# Patient Record
Sex: FEMALE | Race: WHITE
[De-identification: names, ages, dates, MRNs, and addresses within clinical notes are randomized per-mention and may not be internally consistent; named-entity substitution may affect disease eponyms.]

---

## 2019-12-21 ENCOUNTER — HOSPITAL ENCOUNTER (EMERGENCY)
Dept: HOSPITAL 25 - UCCORT | Age: 24
Discharge: HOME | End: 2019-12-21
Payer: COMMERCIAL

## 2019-12-21 VITALS — SYSTOLIC BLOOD PRESSURE: 111 MMHG | DIASTOLIC BLOOD PRESSURE: 68 MMHG

## 2019-12-21 DIAGNOSIS — J32.9: Primary | ICD-10-CM

## 2019-12-21 DIAGNOSIS — Z88.8: ICD-10-CM

## 2019-12-21 DIAGNOSIS — R05: ICD-10-CM

## 2019-12-21 DIAGNOSIS — F17.290: ICD-10-CM

## 2019-12-21 PROCEDURE — 99212 OFFICE O/P EST SF 10 MIN: CPT

## 2019-12-21 PROCEDURE — G0463 HOSPITAL OUTPT CLINIC VISIT: HCPCS

## 2019-12-21 NOTE — UC
Throat Pain/Nasal Robbi HPI





- HPI Summary


HPI Summary: 





Pt had a URI over one week ago which she thinks got better but now has sinus 

pressure, greenish nasal coryza and productive cough. Fever yesterday but not 

today.





- History of Current Complaint


Chief Complaint: UCRespiratory


Stated Complaint: COUGH/FEVER


Time Seen by Provider: 12/21/19 13:06


Hx Obtained From: Patient


Hx Last Menstrual Period: 10/30/19


Pregnant?: No


Onset/Duration: Gradual Onset


Severity: Mild


Pain Intensity: 5


Cough: Productive


Associated Signs & Symptoms: Positive: Wheezing, Sinus Discomfort, Nasal 

Discharge





- Allergies/Home Medications


Allergies/Adverse Reactions: 


 Allergies











Allergy/AdvReac Type Severity Reaction Status Date / Time


 


prednisone Allergy  Headache Verified 12/21/19 12:41











Home Medications: 


 Home Medications





Levothyroxine TAB* [Synthroid TAB*] 75 mcg PO DAILY 12/21/19 [History Confirmed 

12/21/19]


metFORMIN* [Glucophage 500 MG TAB *] 500 mg PO BID 12/21/19 [History Confirmed 

12/21/19]











PMH/Surg Hx/FS Hx/Imm Hx


Previously Healthy: Yes





- Surgical History


Surgical History: Yes


Surgery Procedure, Year, and Place: T&A, lipoma removed 6/2019





- Family History


Known Family History: Positive: Hypertension, Diabetes





- Social History


Alcohol Use: Occasionally


Substance Use Type: None


Substance Use Comment - Amount & Last Used: rare


Smoking Status (MU): Light Every Day Tobacco Smoker


Type: eCigarettes


Amount Used/How Often: casual smoker


Have You Smoked in the Last Year: Yes





- Immunization History


Most Recent Influenza Vaccination: no


Vaccination Up to Date: Yes





Review of Systems


All Other Systems Reviewed And Are Negative: Yes


Constitutional: Positive: Fever - Fever yesterday


ENT: Positive: Nasal Discharge, Sinus Congestion, Sinus Pain/Tenderness - Yellow

/green nasal coryza and post nasal drainage


Respiratory: Positive: Cough - Occasionally coughs up greenish phlegm


Is Patient Immunocompromised?: No





Physical Exam


Triage Information Reviewed: Yes


Appearance: Well-Appearing, No Pain Distress, Well-Nourished


Vital Signs: 


 Initial Vital Signs











Temp  98.5 F   12/21/19 12:45


 


Pulse  77   12/21/19 12:45


 


Resp  18   12/21/19 12:45


 


BP  111/68   12/21/19 12:45


 


Pulse Ox  99   12/21/19 12:45











Vital Signs Reviewed: Yes


Eyes: Positive: Conjunctiva Clear


ENT: Positive: Pharynx normal - Yellow post-nasal drainage, Nasal congestion, 

Nasal drainage, TMs normal, Sinus tenderness, Uvula midline


Neck: Positive: Supple, Nontender, No Lymphadenopathy


Respiratory: Positive: Lungs clear, Normal breath sounds, No respiratory 

distress, No accessory muscle use


Cardiovascular: Positive: RRR, No Murmur, Pulses Normal, Brisk Capillary Refill


Musculoskeletal Exam: Normal


Neurological Exam: Normal


Psychological Exam: Normal


Skin Exam: Normal





Throat Pain/Nasal Course/Dx





- Course


Course Of Treatment: 





Comfortable here. Pt taught use of inhaler





- Differential Dx/Diagnosis


Provider Diagnosis: 


 Sinusitis








Discharge ED





- Sign-Out/Discharge


Documenting (check all that apply): Patient Departure


All imaging exams completed and their final reports reviewed: No Studies





- Discharge Plan


Condition: Good


Disposition: HOME


Prescriptions: 


Albuterol HFA INHALER* [Ventolin HFA Inhaler*] 2 puff INH Q4H PRN 5 Days #1 mdi


 PRN Reason: Wheezing


Amoxicillin/Clavulanate TAB* [Augmentin *] 875 mg PO BID 10 Days #20 tab


Patient Education Materials:  Sinusitis (ED)


Referrals: 


Katarina Lazaro PA [Primary Care Provider] - 


Additional Instructions: 


Increase fluids, take the Augmentin with food. Use the inhaler every 4 hours as 

needed for wheezing. See your doctor in 5-6 days if no improvement. Go to the 

ER if worsening symptoms.





- Billing Disposition and Condition


Condition: GOOD


Disposition: Home

## 2020-03-20 ENCOUNTER — HOSPITAL ENCOUNTER (EMERGENCY)
Dept: HOSPITAL 25 - UCCORT | Age: 25
Discharge: HOME | End: 2020-03-20
Payer: SELF-PAY

## 2020-03-20 VITALS — SYSTOLIC BLOOD PRESSURE: 116 MMHG | DIASTOLIC BLOOD PRESSURE: 82 MMHG

## 2020-03-20 DIAGNOSIS — F17.290: ICD-10-CM

## 2020-03-20 DIAGNOSIS — Z91.018: ICD-10-CM

## 2020-03-20 DIAGNOSIS — Z88.8: ICD-10-CM

## 2020-03-20 DIAGNOSIS — M54.5: Primary | ICD-10-CM

## 2020-03-20 DIAGNOSIS — Z91.030: ICD-10-CM

## 2020-03-20 PROCEDURE — G0463 HOSPITAL OUTPT CLINIC VISIT: HCPCS

## 2020-03-20 PROCEDURE — 99212 OFFICE O/P EST SF 10 MIN: CPT

## 2020-03-20 NOTE — UC
Back Pain HPI





- HPI Summary


HPI Summary: 





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  Pt states pain began gradually and worsened 

through the night





- History of Current Complaint


Chief Complaint: UCBackPain


Stated Complaint: BACK PAIN


Time Seen by Provider: 03/20/20 10:29


Hx Obtained From: Patient


Hx Last Menstrual Period: "[place] ... I'm not ovulating"


Pregnant?: No


Onset/Duration: Gradual Onset, Lasting Hours


Timing: Constant


Severity Initially: Mild


Severity Currently: Moderate


Pain Intensity: 5


Character: Dull, Aching, Stiffness


Aggravating Factor(s): Movement, Bending


Alleviating Factor(s): Rest, Position


Associated Signs And Symptoms: Positive: Negative





- Risk Factors


AAA Risk Factors: Negative


TAD Risk Factors: Negative


Cauda Equina Risk Factors: Negative


Epidural Abscess Risk Factors: Negative





- Allergies/Home Medications


Allergies/Adverse Reactions: 


 Allergies











Allergy/AdvReac Type Severity Reaction Status Date / Time


 


pedro Allergy  Anaphylatic Verified 03/20/20 09:55





   Shock  


 


prednisone AdvReac  Headache Verified 03/20/20 09:55


 


wasps Allergy  Swelling Uncoded 03/20/20 09:55











Home Medications: 


 Home Medications





Levothyroxine TAB* [Synthroid 75 MCG TAB*] 75 mcg PO DAILY 12/21/19 [History 

Confirmed 03/20/20]


metFORMIN* [Glucophage 500 MG TAB *] 500 mg PO BID 12/21/19 [History Confirmed 

03/20/20]


Acetaminophen [Acetaminophen Extra Strength] 2,000 mg PO ONCE 03/20/20 [History 

Confirmed 03/20/20]


Cyclobenzaprine TAB* [Flexeril 10 MG TAB*] 10 mg PO Q8H PRN #15 tab 03/20/20 [Rx

]


Ibuprofen TAB* [Motrin TAB* 800 MG] 800 mg PO Q8H PRN #15 tab 03/20/20 [Rx]


Prenatal Vitamin TAB* 1 tab PO DAILY 03/20/20 [History Confirmed 03/20/20]


Prenatal Vitamin TAB* 2 tab PO DAILY 03/20/20 [History Confirmed 03/20/20]











PMH/Surg Hx/FS Hx/Imm Hx


Previously Healthy: Yes





- Surgical History


Surgical History: Yes


Surgery Procedure, Year, and Place: Posterior Skull Lipoma, 2019, [facility]; 

T&A, ~2008, [facility]





- Family History


Known Family History: Positive: Hypertension, Diabetes





- Social History


Lives: With Family


Alcohol Use: Occasionally


Substance Use Type: Marijuana


Substance Use Comment - Amount & Last Used: Occasionally


Smoking Status (MU): Light Every Day Tobacco Smoker


Type: eCigarettes


Amount Used/How Often: "A lot"


Length of Time of Smoking/Using Tobacco: Since Age 13


Have You Smoked in the Last Year: Yes


When Did the Patient Quit Smoking/Using Tobacco: No cigarettes since 2013





- Immunization History


Most Recent Influenza Vaccination: no


Vaccination Up to Date: Yes





Review of Systems


All Other Systems Reviewed And Are Negative: Yes


Constitutional: Positive: Negative


Skin: Positive: Negative


Eyes: Positive: Negative


ENT: Positive: Negative


Respiratory: Positive: Negative


Cardiovascular: Positive: Negative


Gastrointestinal: Positive: Negative


Genitourinary: Positive: Negative


Motor: Positive: Negative


Neurovascular: Positive: Negative


Musculoskeletal: Positive: Myalgia - low back


Neurological/Mental Status: Positive: Negative


Psychological: Positive: Negative


Is Patient Immunocompromised?: No





Physical Exam


Triage Information Reviewed: Yes


Appearance: Well-Appearing


Vital Signs: 


 Initial Vital Signs











Temp  98.2 F   03/20/20 09:49


 


Pulse  64   03/20/20 09:49


 


Resp  18   03/20/20 09:49


 


BP  116/82   03/20/20 09:49


 


Pulse Ox  100   03/20/20 09:49











Vital Signs Reviewed: Yes


Eye Exam: Normal


ENT Exam: Normal


ENT: Positive: Hearing grossly normal


Dental Exam: Normal


Respiratory: Positive: No respiratory distress


Musculoskeletal: Positive: Other: - c/o low back myalgia


Neurological Exam: Normal


Psychological Exam: Normal


Skin Exam: Normal





Back Pain Course/Dx





- Differential Dx/Diagnosis


Differential Diagnosis/HQI/PQRI: Strain, Sprain


Provider Diagnosis: 


 Low back ache








Discharge ED





- Sign-Out/Discharge


Documenting (check all that apply): Patient Departure


All imaging exams completed and their final reports reviewed: No Studies





- Discharge Plan


Condition: Stable


Disposition: HOME


Prescriptions: 


Cyclobenzaprine TAB* [Flexeril 10 MG TAB*] 10 mg PO Q8H PRN #15 tab


 PRN Reason: Pain - Mild


Ibuprofen TAB* [Motrin TAB* 800 MG] 800 mg PO Q8H PRN #15 tab


 PRN Reason: Pain - Mild


Patient Education Materials:  Low Back Strain (ED)


Forms:  *Work Release


Referrals: 


Katarina Lazaro PA [Primary Care Provider] - If Needed





- Billing Disposition and Condition


Condition: STABLE


Disposition: Home